# Patient Record
Sex: FEMALE | Race: WHITE | HISPANIC OR LATINO | Employment: OTHER | URBAN - METROPOLITAN AREA
[De-identification: names, ages, dates, MRNs, and addresses within clinical notes are randomized per-mention and may not be internally consistent; named-entity substitution may affect disease eponyms.]

---

## 2021-03-24 ENCOUNTER — IMMUNIZATION (OUTPATIENT)
Dept: NURSING | Facility: CLINIC | Age: 56
End: 2021-03-24
Payer: COMMERCIAL

## 2021-03-24 PROCEDURE — 0001A PR COVID VAC PFIZER DIL RECON 30 MCG/0.3 ML IM: CPT

## 2021-03-24 PROCEDURE — 91300 PR COVID VAC PFIZER DIL RECON 30 MCG/0.3 ML IM: CPT

## 2021-04-14 ENCOUNTER — OFFICE VISIT (OUTPATIENT)
Dept: NURSING | Facility: CLINIC | Age: 56
End: 2021-04-14
Attending: INTERNAL MEDICINE
Payer: COMMERCIAL

## 2021-04-14 PROCEDURE — 0002A PR COVID VAC PFIZER DIL RECON 30 MCG/0.3 ML IM: CPT

## 2021-04-14 PROCEDURE — 91300 PR COVID VAC PFIZER DIL RECON 30 MCG/0.3 ML IM: CPT

## 2021-05-01 ENCOUNTER — HEALTH MAINTENANCE LETTER (OUTPATIENT)
Age: 56
End: 2021-05-01

## 2021-10-11 ENCOUNTER — HEALTH MAINTENANCE LETTER (OUTPATIENT)
Age: 56
End: 2021-10-11

## 2022-05-22 ENCOUNTER — HEALTH MAINTENANCE LETTER (OUTPATIENT)
Age: 57
End: 2022-05-22

## 2022-09-25 ENCOUNTER — HEALTH MAINTENANCE LETTER (OUTPATIENT)
Age: 57
End: 2022-09-25

## 2023-05-08 ENCOUNTER — HEALTH MAINTENANCE LETTER (OUTPATIENT)
Age: 58
End: 2023-05-08

## 2023-06-04 ENCOUNTER — HEALTH MAINTENANCE LETTER (OUTPATIENT)
Age: 58
End: 2023-06-04

## 2024-09-24 ENCOUNTER — OFFICE VISIT (OUTPATIENT)
Dept: VASCULAR SURGERY | Facility: CLINIC | Age: 59
End: 2024-09-24
Payer: COMMERCIAL

## 2024-09-24 DIAGNOSIS — I78.1 SPIDER VEINS: Primary | ICD-10-CM

## 2024-09-24 PROCEDURE — 99203 OFFICE O/P NEW LOW 30 MIN: CPT | Performed by: SURGERY

## 2024-09-24 NOTE — LETTER
9/24/2024      Jodi Castro  [address] [place] [id number]      Dear Colleague,    Thank you for referring your patient, Jodi Castro, to the [facility] VEIN CLINIC [place]. Please see a copy of my visit note below.    After Visit Follow Up  Per Dr. Melton, patient was recommended to have 2 - 4 sessions at $407.00 of cosmetic sclerotherapy.    Reviewed with and gave to patient our vein clinic sclerotherapy packet of information which includes basic sclerotherapy information, pre-treatment instructions and post-treatment instructions.    Patient in agreement with plan and had no further questions.    Dai Woods RN on 9/24/2024 at 8:32 AM    VEINSOLUTIONS CONSULTATION    HPI:    Jodi Castro is a pleasant 59 year old self-referred female who presents with concerns of cosmetic spider telangiectasias.  She is self-conscious about these telangiectasias, not wanting to show her legs.    She has no pain has had no deep vein thrombosis, superficial phlebitis or hemorrhage.    Family history significant for telangiectasias in her mother.  She is not aware if she had treatment or not.  There is no family history of venous thromboembolism.    PAST MEDICAL HISTORY: Once to twice monthly migraine headaches    PAST SURGICAL HISTORY: No past surgical history on file.    FAMILY HISTORY: No family history on file.    SOCIAL HISTORY:   Social History     Tobacco Use     Smoking status: Never     Smokeless tobacco: Never   Substance Use Topics     Alcohol use: Not on file       REVIEW OF SYSTEMS: Review Of Systems  Skin: negative  Eyes: negative  Ears/Nose/Throat: negative  Respiratory: No shortness of breath, dyspnea on exertion, cough, or hemoptysis  Cardiovascular: negative  Gastrointestinal: negative  Genitourinary: negative  Musculoskeletal: Leg cramps/pain  Neurologic: Headaches  Psychiatric: negative  Hematologic/Lymphatic/Immunologic: negative  Endocrine: Hot  flashes      Vital signs:  There were no vitals taken for this visit.    Current Outpatient Medications   Medication Sig Dispense Refill     Multiple Vitamin (MULTIVITAMIN PO) Take by mouth daily.       TURMERIC PO Take by mouth daily.       VITAMIN D PO Take by mouth daily.         PHYSICAL EXAM:  General: Pleasant, NAD.   HEENT: Normocephalic, atraumatic, external ears and nose normal.   Respiratory: Normal respiratory effort.   Cardiovascular: Pulse is regular.   Musculoskeletal: Gait and station normal.  The joints of her fingers and toes without deformity.  There is no cyanosis of her nailbeds.   EXTREMITIES: Right lower extremity: Scattered telangiectasias on the proximal anterolateral right thigh extending across the popliteal fossa.  No varicose veins, stasis changes or edema.    Left lower extremity: Cluster of matted telangiectasias in the distal posterolateral thigh, extending into the lateral popliteal fossa.  No varicose veins, stasis changes or edema..    PULSES: R/L (3=normal pulse, 0=no palpable pulse) dorsalis pedis: 3/3; posterior tibial: 3/3.      Neurologic: Grossly normal  Psychiatric: Mood, affect, judgment and insight are normal       ASSESSMENT:  Bilateral lower extremity spider veins and telangiectasias of cosmetic concern.  We discussed the fact that these are estrogen related and of little medical risk other than a very small risk of hemorrhage.  The only reason to treat them would be for cosmetic purposes with goals of 75 to 80% reduction in the appearance of the veins.  She understands that multiple sessions may be necessary.    Risks of treatment including allergic reaction, deep vein thrombosis, ulceration, hyperpigmentation and trapped blood were discussed.  She voiced understanding and her questions answered.    PLAN:  Bilateral lower extremity cosmetic sclerotherapy     Cortez Melton MD    Dictated using Dragon voice recognition software which may result in transcription  errors          VEIN CLINIC LEG DRAWING:                Again, thank you for allowing me to participate in the care of your patient.        Sincerely,        Cortez Melton MD   38.6

## 2024-09-24 NOTE — PROGRESS NOTES
After Visit Follow Up  Per Dr. Melton, patient was recommended to have 2 - 4 sessions at $407 of cosmetic sclerotherapy.    Reviewed with and gave to patient our vein clinic sclerotherapy packet of information which includes basic sclerotherapy information, pre-treatment instructions and post-treatment instructions.    Patient in agreement with plan and had no further questions.    Meghan Lopez RN  [facility]  Vein Clinic

## 2024-09-24 NOTE — PROGRESS NOTES
VEINSOLUTIONS CONSULTATION    HPI:    Jodi Castro is a pleasant 59 year old self-referred female who presents with concerns of cosmetic spider telangiectasias.  She is self-conscious about these telangiectasias, not wanting to show her legs.    She has no pain has had no deep vein thrombosis, superficial phlebitis or hemorrhage.    Family history significant for telangiectasias in her mother.  She is not aware if she had treatment or not.  There is no family history of venous thromboembolism.    PAST MEDICAL HISTORY: Once to twice monthly migraine headaches    PAST SURGICAL HISTORY: No past surgical history on file.    FAMILY HISTORY: No family history on file.    SOCIAL HISTORY:   Social History     Tobacco Use    Smoking status: Never    Smokeless tobacco: Never   Substance Use Topics    Alcohol use: Not on file       REVIEW OF SYSTEMS: Review Of Systems  Skin: negative  Eyes: negative  Ears/Nose/Throat: negative  Respiratory: No shortness of breath, dyspnea on exertion, cough, or hemoptysis  Cardiovascular: negative  Gastrointestinal: negative  Genitourinary: negative  Musculoskeletal: Leg cramps/pain  Neurologic: Headaches  Psychiatric: negative  Hematologic/Lymphatic/Immunologic: negative  Endocrine: Hot flashes      Vital signs:  There were no vitals taken for this visit.    Current Outpatient Medications   Medication Sig Dispense Refill    Multiple Vitamin (MULTIVITAMIN PO) Take by mouth daily.      TURMERIC PO Take by mouth daily.      VITAMIN D PO Take by mouth daily.         PHYSICAL EXAM:  General: Pleasant, NAD.   HEENT: Normocephalic, atraumatic, external ears and nose normal.   Respiratory: Normal respiratory effort.   Cardiovascular: Pulse is regular.   Musculoskeletal: Gait and station normal.  The joints of her fingers and toes without deformity.  There is no cyanosis of her nailbeds.   EXTREMITIES: Right lower extremity: Scattered telangiectasias on the proximal anterolateral right  thigh extending across the popliteal fossa.  No varicose veins, stasis changes or edema.    Left lower extremity: Cluster of matted telangiectasias in the distal posterolateral thigh, extending into the lateral popliteal fossa.  No varicose veins, stasis changes or edema..    PULSES: R/L (3=normal pulse, 0=no palpable pulse) dorsalis pedis: 3/3; posterior tibial: 3/3.      Neurologic: Grossly normal  Psychiatric: Mood, affect, judgment and insight are normal       ASSESSMENT:  Bilateral lower extremity spider veins and telangiectasias of cosmetic concern.  We discussed the fact that these are estrogen related and of little medical risk other than a very small risk of hemorrhage.  The only reason to treat them would be for cosmetic purposes with goals of 75 to 80% reduction in the appearance of the veins.  She understands that multiple sessions may be necessary.    Risks of treatment including allergic reaction, deep vein thrombosis, ulceration, hyperpigmentation and trapped blood were discussed.  She voiced understanding and her questions answered.    PLAN:  Bilateral lower extremity cosmetic sclerotherapy     Cortez Melton MD    Dictated using Dragon voice recognition software which may result in transcription errors          VEIN CLINIC LEG DRAWING:

## 2024-09-24 NOTE — PROGRESS NOTES
After Visit Follow Up  Per Dr. Melton, patient was recommended to have 2 - 4 sessions at $407.00 of cosmetic sclerotherapy.    Reviewed with and gave to patient our vein clinic sclerotherapy packet of information which includes basic sclerotherapy information, pre-treatment instructions and post-treatment instructions.    Patient in agreement with plan and had no further questions.    Dai Woods RN on 9/24/2024 at 8:32 AM

## 2024-09-24 NOTE — PATIENT INSTRUCTIONS
Sclerotherapy: Pre-Treatment Instructions    Recommended Sessions:  __2-4____ treatment sessions    Pricing: Full session - $407                 *Payment is due at the time of visit following the treatment    Time Required per Treatment Session - About 45 minutes  Please come in 15 minutes before your scheduled appointment.  30 min.  Sclerotherapy treatments last approximately 30 minutes.  5 min.    A staff member will wipe your legs off with warm water and dry them with a wash cloth.                 Then you can put your compression hose on, get dressed and check out.  10 min.  After your treatment, you will be asked to walk around for 10 minutes before you get in your car.    Medications  Five days before your appointment, discontinue aspirin (Bufferin, Anacin, etc.) and Ibuprofen (Motrin, Advil, Aleve, etc.) to reduce bruising. Resume these medications the day following the treatment.    Leg Preparation  Do not shave your legs or apply any oil, lotion or powder the night before or the day of your treatment.    Clothing  Shorts:  Bring a pair of loose, comfortable shorts to wear during your treatment (or you can choose to wear ours). Shoes: Bring comfortable shoes to accommodate the compression hose after your treatment. Do not wear flip-flops or thong-style sandals unless you have open-toe compression hose.    Photographs  Photos will be taken before each treatment. This helps monitor your progress.    Injections  The physician will inject your veins with the sclerotherapy solution chosen to meet your specific needs.    Compression Hose  Please bring your compression hose if you have them. They may also be reserved for you at our clinic. Compression hose must be worn immediately after each sclerotherapy treatment.  The hose must be compression level 20-30, and they must be worn for 24 hours straight after your treatment.  If you have never worn compression hose before, a staff member will teach you how to put  them on.             You cannot have a treatment without compression hose.               They are critical to the success of your treatment!    You may purchase your compression hose from us. We will measure you and have the hose available when you come for your treatment.    Cancellation and Rescheduling  If you need to cancel or reschedule your sclerotherapy treatment, please give our office at least 24 hour notice.    Sclerotherapy: Basic Information    What is sclerotherapy?  Sclerotherapy is a treatment for  spider  veins.  Spider  veins are small veins just under your skin that can look red, blue or purple. Most  spider  veins are only a cosmetic problem.  Spider  veins are not useful and treating them will not affect your circulation.    How does sclerotherapy work?  1.  Injections: A very small needle is used to inject a solution into the  spider  veins. The solution irritates the cells that line the vein walls. This causes the veins to collapse. The vein walls to stick together and they can no longer carry blood. Different solutions are used based on the size of the veins.  2.  Compression:  The spider veins are kept collapsed by wearing compression stockings. Your body will break down and absorb the treated veins. You wear the compression hose for 24 hours after the treatment and then for 4 more days during your waking hours only.    How does the body heal after sclerotherapy?  The process is similar to how your body heals after a bad bruise. It takes 4-6 weeks or more for the healing to be complete. When the healing is complete, the vein is no longer visible. It may take more than one treatment.    How do I get the best results?  It is important to follow the post-sclerotherapy instructions. The best results require time and patience. The injection sites will continue to heal and fade for months after a treatment. Please discuss your expectations with your doctor to keep them realistic. Your doctor will  do everything possible to meet or exceed your expectations.    How many treatments are needed?  After your initial exam, your doctor will give you an estimate of the number of treatments that may be required. It depends upon the size, type, and quantity of your  spider  veins and on the doctor's assessment, your history and expectations. You may end up needing fewer or more treatments.    How soon can I have another treatment?  Additional treatments are scheduled every 4-6 weeks to allow time for the body to respond to the previous treatment.    Common Side Effects:  Itching  The areas that were injected may itch. This is usually mild and lasts less than a day. Do not use lotions or creams on your legs until the injection sites have healed over.    Pain  It is common to have some tenderness at the injection sites. Injection of the solution can be uncomfortable, but is usually well tolerated by most patients. The tenderness is temporary, lasting 24 hours at most. Tylenol or Ibuprofen can be used, if needed, following the product directions.    Bruising  This may occur at the injections sites. Bruising may be minimized by avoiding Aspirin and Ibuprofen products for five days before each treatment session.    Hyperpigmentation  A light brown discoloration of the skin may develop along the veins in the areas injected. Approximately 20-30% of patients treated note the discoloration (which is lighter and less obvious than the veins that are being treated). The hyperpigmentation usually fades in a couple of weeks, but may take several months to a year to totally resolve. There is a 1% chance of hyperpigmentation continuing after one year.    Trapped blood  A small amount of blood may become trapped and hardened in the veins. This may feel like a knot or cord and it may look dark blue or bruised. This is a common occurrence. You may need to return before your next treatment so this area can be drained to remove the trapped  blood. This will reduce the hyperpigmentation that can occur. The chance of this occurring can be decreased with proper use of compression hose after your treatment.    Matting  Matting is the formation of new, fine  spider  veins in the area injected.  It occurs in approximately 10% of patients injected. The exact reason for this is unknown. If untreated, the matting usually resolves in 3 to 12 months, but very rarely, it can be permanent. If the matting does not fade, it can be re-injected.    Rare Side Effects:  Ulceration at injection sites  Very rarely, a small ulcer will occur at the site where a vein is injected. An ulcer can take 4 to 6 weeks to completely heal. A small scar may result.    Allergic reactions  There is a very rare incidence of an allergic reaction to the solution injected. You will be observed for such reaction and will be treated appropriately should it occur. Please inform us of any allergy history.    Pulmonary embolus/Deep vein thrombosis  This is a blood clot which moves to the lungs/a blood clot in the deep vein system. There is an extraordinarily low incidence of this complication.      SCLEROTHERAPY AFTER CARE  Immediately:  After treatment, walk for 10-15 minutes before getting in your car.  If your trip home is more than 1 hour, stop and walk around for 5-10 minutes. Avoid sitting or standing for extended periods.   First 24 hours: Wear your compression continuously, even while in bed. After the 24 hours, you may shower if you want to. Put your hose back on, unless you are going to bed. You should NOT wear compression to bed after the first 24 hours. You may fly the next day, but wear your compression.   For 5 days: Wear the compression hose for waking hours only. You may continue to wear them longer than 5 days if you prefer.   For days 5-7: Walking is encouraged, as it promotes efficient circulation in your veins. You may do activities that raise your heart rate, but do NOT run,  jog, do high impact aerobics, or weight lifting. After 7 days, no activity restrictions.  Shaving: Wait a few days to shave or apply lotion.   Bathing: Do NOT take hot baths or sit in a hot tub for 7-10 days.    For 1 year: Wear SPF 30 sunscreen on your legs when in the sun. This is very important! It helps prevent darkening of the skin at the injection sites.   Medications: You may resume your usual medications, including aspirin or ibuprofen.    Common Things to Expect       Compression must be worn for the first 24 hours and then during the day for 5-7 days.    If larger veins are treated with ultrasound-guided sclerotherapy, you will have redness, firmness, tenderness, and swelling.  This firmness and tenderness may take 3-6 months to resolve. Ibuprofen and compression hose will aid in this process.    You will have bruising that can last up to 3 weeks. Most fading of the veins will occur between 3 and 6 weeks after treatment.    You may notice brown discoloration (hyperpigmentation) at the treatment site.  This should fade with time, but will take 3 months to 1 year to fully heal.     Some treated veins may look darker because of trapped blood within the vein. This trapped blood can be removed at a minimum of 1 month following treatment. Larger veins are more likely to develop trapped blood.    It is very important for you to use at least SPF 30 sunscreen in order to help prevent the discoloration of your skin.    Thigh High, medical grade, 20-30 mmHg strength, compression stockings are recommended (required if having a procedure or treatment)    Options for purchasing compression stockings:    You can call your insurance company and ask if compression stockings are covered.  They usually fall under your Durable Medical Equipment (DME) coverage, if covered. The DME codes if they ask are: Knee high , Thigh high , Panty .   Be sure to ask if you need a specific diagnosis for coverage, if your  deductible needs to be met first, how many pairs are covered and how often.  If insurance covers and you would like to order from [facility], or another medical supply company: call the clinic back and we can fax a prescription to your medical supply company of choice. They will bill your insurance and ship them to you. We will ask you color choice (black or beige), and toe choice (open or closed toe).    We sell many sizes in our clinic (except specialty order or petite sizing). We can check to see if we have your size.  Knee high (Mediven brand) are $60/pair  Thigh high (Mediven brand) are $85/pair.   If you would like to purchase from the clinic, let us know including your color choice (black or beige), and toe choice (open or closed toe), and we will set them aside for you to  and pay for at your next clinic appointment or the day of your procedure.    Online website ordering options:   [email or web address]  [email or web address]  [email or web address]  Amazon.com has many options available.         Migraines rarely occur following sclerotherapy, but are more likely in patients with a history of migraines.  Treat as you would any other migraine.

## 2024-10-18 ENCOUNTER — OFFICE VISIT (OUTPATIENT)
Dept: VASCULAR SURGERY | Facility: CLINIC | Age: 59
End: 2024-10-18
Payer: COMMERCIAL

## 2024-10-18 ENCOUNTER — ALLIED HEALTH/NURSE VISIT (OUTPATIENT)
Dept: VASCULAR SURGERY | Facility: CLINIC | Age: 59
End: 2024-10-18
Payer: COMMERCIAL

## 2024-10-18 DIAGNOSIS — I78.1 SPIDER VEINS: Primary | ICD-10-CM

## 2024-10-18 DIAGNOSIS — I78.1 SPIDER TELANGIECTASIS OF SKIN: Primary | ICD-10-CM

## 2024-10-18 PROCEDURE — 99207 PR NO CHARGE NURSE ONLY: CPT

## 2024-10-18 PROCEDURE — A6533 GC STOCKING THIGHLNGTH 18-30: HCPCS

## 2024-10-18 PROCEDURE — 36468 NJX SCLRSNT SPIDER VEINS: CPT | Performed by: SURGERY

## 2024-10-18 PROCEDURE — S9999 SALES TAX: HCPCS | Performed by: SURGERY

## 2024-10-18 NOTE — LETTER
10/18/2024      Jodi Castro  [address] [place] [id number]      Dear Colleague,    Thank you for referring your patient, Jodi Castro, to the [facility] VEIN CLINIC [place]. Please see a copy of my visit note below.        Vein Clinic Sclerotherapy Note     Indications:  Bilateral lower extremity spider telangiectasias of cosmetic concern     Procedure:  Bilateral lower extremity cosmetic sclerotherapy     Procedure description  Details of procedure including risks of allergic reaction, deep vein thrombosis, ulceration, superficial thrombophlebitis and hyperpigmentation were discussed.  The patient voiced understanding and wished to proceed.  Informed consent was obtained.    The patient had numerous reticular veins and telangiectasias most numerous over the posterolateral right thigh extending into the proximal posterior thigh.  She had mirror-image veins on the left lateral thigh but not as numerous as on the right with a few scattered over the distal medial thighs and a few scattered on the legs.  I donned the Syris headlight and injected these veins using 0.5% polidocanol foam.      I had her turn onto her stomach and injected reticular veins and telangiectasias in the popliteal fossas and proximal posterior thighs bilaterally.  Veins were more numerous in the right posterior thigh than in the left. We used a total of 3 syringes of 0.5% polidocanol foam.  I had the patient perform ankle pumps.    We cleaned her legs, had her don compression that had her walk for 10 minutes.  She will return in approximately 6 weeks in follow-up.    Sclerotherapy    Date/Time: 10/18/2024 2:55 PM    Performed by: Cortez Melton MD  Authorized by: Cortez Melton MD    Time out: Immediately prior to the procedure a time out was called    Type:  Cosmetic  Session:  Full  Procedure side:  Bilateral  Solution/Amount:  .5 POLIDOCANOL  Syringes:  3  Patient  tolerance:  Patient tolerated the procedure well with no immediate complications      LILLIE Melton MD    Dictated using Dragon voice recognition software which may result in transcription errors      Again, thank you for allowing me to participate in the care of your patient.        Sincerely,        Cortez Melton MD

## 2024-10-18 NOTE — PROGRESS NOTES
Patient purchased 1 pair(s) of thigh high, Black, CT, size 2 from the clinic today.     Informed patient all compression hose purchases are final.    Pola Rosa RN on 10/18/2024 at 2:27 PM

## 2024-10-18 NOTE — PROGRESS NOTES
Vein Clinic Sclerotherapy Note     Indications:  Bilateral lower extremity spider telangiectasias of cosmetic concern     Procedure:  Bilateral lower extremity cosmetic sclerotherapy     Procedure description  Details of procedure including risks of allergic reaction, deep vein thrombosis, ulceration, superficial thrombophlebitis and hyperpigmentation were discussed.  The patient voiced understanding and wished to proceed.  Informed consent was obtained.    The patient had numerous reticular veins and telangiectasias most numerous over the posterolateral right thigh extending into the proximal posterior thigh.  She had mirror-image veins on the left lateral thigh but not as numerous as on the right with a few scattered over the distal medial thighs and a few scattered on the legs.  I donned the Syris headlight and injected these veins using 0.5% polidocanol foam.      I had her turn onto her stomach and injected reticular veins and telangiectasias in the popliteal fossas and proximal posterior thighs bilaterally.  Veins were more numerous in the right posterior thigh than in the left. We used a total of 3 syringes of 0.5% polidocanol foam.  I had the patient perform ankle pumps.    We cleaned her legs, had her don compression that had her walk for 10 minutes.  She will return in approximately 6 weeks in follow-up.    Sclerotherapy    Date/Time: 10/18/2024 2:55 PM    Performed by: Cortez Melton MD  Authorized by: Cortez Melton MD    Time out: Immediately prior to the procedure a time out was called    Type:  Cosmetic  Session:  Full  Procedure side:  Bilateral  Solution/Amount:  .5 POLIDOCANOL  Syringes:  3  Patient tolerance:  Patient tolerated the procedure well with no immediate complications      LILLIE Melton MD    Dictated using Dragon voice recognition software which may result in transcription errors

## 2024-12-05 ENCOUNTER — OFFICE VISIT (OUTPATIENT)
Dept: VASCULAR SURGERY | Facility: CLINIC | Age: 59
End: 2024-12-05
Payer: COMMERCIAL

## 2024-12-05 DIAGNOSIS — I78.1 SPIDER TELANGIECTASIS OF SKIN: Primary | ICD-10-CM

## 2024-12-05 NOTE — PROGRESS NOTES
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  The patient voiced understanding and wished to proceed.  Informed consent was obtained.    I donned the Syris headlight and injected residual telangiectasias most removed only proximal posterior/posterolateral thigh extending down the posterolateral thigh to the knee, and both popliteal fossas and extending down the lateral and posterior left thigh as well.  We used a total of 3 syringes (9 mL) of 1% polidocanol foam.  I had the patient perform ankle pumps.    We cleaned her legs, had her don compression that had her walk for 10 minutes.  She will return in about 6 weeks in follow-up, possibly for 1 additional session.     Sclerotherapy    Date/Time: 12/5/2024 12:17 PM    Performed by: Cortez Melton MD  Authorized by: Cortez Melton MD    Time out: Immediately prior to the procedure a time out was called    Type:  Cosmetic  Session:  Full  Procedure side:  Bilateral  Solution/Amount:  .5 POLIDOCANOL  Syringes:  3 syringes (9 mL 1% polidocanol foam)  Evacuation of intraluminal thrombus under sterile conditions without complications.    Patient tolerance:  Patient tolerated the procedure well with no immediate complications  Wrap/Hose:  Sabas Melton MD    Dictated using Dragon voice recognition software which may result in transcription errors

## 2024-12-05 NOTE — LETTER
12/5/2024      Jodi Castro  [address] [place] [id number]      Dear Colleague,    Thank you for referring your patient, Jodi Castro, to the [facility] [place]. Please see a copy of my visit note below.        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  The patient voiced understanding and wished to proceed.  Informed consent was obtained.    I donned the Syris headlight and injected residual telangiectasias most removed only proximal posterior/posterolateral thigh extending down the posterolateral thigh to the knee, and both popliteal fossas and extending down the lateral and posterior left thigh as well.  We used a total of 3 syringes (9 mL) of 1% polidocanol foam.  I had the patient perform ankle pumps.    We cleaned her legs, had her don compression that had her walk for 10 minutes.  She will return in about 6 weeks in follow-up, possibly for 1 additional session.     Sclerotherapy    Date/Time: 12/5/2024 12:17 PM    Performed by: Cortez Melton MD  Authorized by: Cortez Melton MD    Time out: Immediately prior to the procedure a time out was called    Type:  Cosmetic  Session:  Full  Procedure side:  Bilateral  Solution/Amount:  .5 POLIDOCANOL  Syringes:  3 syringes (9 mL 1% polidocanol foam)  Evacuation of intraluminal thrombus under sterile conditions without complications.    Patient tolerance:  Patient tolerated the procedure well with no immediate complications  Wrap/Hose:  Sabas Melton MD    Dictated using Dragon voice recognition software  which may result in transcription errors      Again, thank you for allowing me to participate in the care of your patient.        Sincerely,        Cortez Melton MD

## 2025-01-16 ENCOUNTER — OFFICE VISIT (OUTPATIENT)
Dept: VASCULAR SURGERY | Facility: CLINIC | Age: 60
End: 2025-01-16
Payer: COMMERCIAL

## 2025-01-16 DIAGNOSIS — I78.1 SPIDER VEINS: Primary | ICD-10-CM

## 2025-01-16 NOTE — PROGRESS NOTES
[facility]in Clinic Consult Note    HPI:  Jodi Castro is a 59 year old female who was seen today following 2 sessions of cosmetic sclerotherapy performed by Dr. Melton.  Overall she is very happy with result in the bothersome spots on her right lateral thigh as well as her left lateral knee have cleared up and she is very happy with the result.  She denies any issues with skin changes, bruising, swelling and at this time she does not feel she needs further intervention.    ROS: Negative except as above mentioned in HPI    PHH:  No past medical history on file.   No past surgical history on file.    ALLERGIES:  Patient has no known allergies.    MEDS:    Current Outpatient Medications:     Multiple Vitamin (MULTIVITAMIN PO), Take by mouth daily., Disp: , Rfl:     TURMERIC PO, Take by mouth daily., Disp: , Rfl:     VITAMIN D PO, Take by mouth daily., Disp: , Rfl:     SOCIAL HABITS:    History   Smoking Status    Never   Smokeless Tobacco    Never     Social History    Substance and Sexual Activity      Alcohol use: Not on file      History   Drug Use Not on file       FAMILY HISTORY:  No family history on file.    PHYSICAL EXAMINATION:  Comfortable and in NAD   Respirations non-labored on room air   Regular rate, capillary refill brisk  Abdomen is non-tender   Extremities are warm and perfused without significant edema.  Pedal pulses are palpable  Skin is intact without ulceration or open wounds there is very mild discoloration on the posterior lateral right thigh and an injection site.  No matting or bruising present overlying the lower extremities.  There are very few very fine scattered spider telangiectasias and some reticular veins but no significant varicose veins.    IMAGING: None performed for this visit    ASSESSMENT:   Jodi Castro is a 59 year old female who is status post 2 sessions of cosmetic sclerotherapy and overall is happy  with result.  Today she feels like she does not have any other areas that are particularly bothersome to her she would like to have injected.  I have discussed with her that there is a possibility of recurrence and if anything pops up she can come back and see me for another session of cosmetic sclerotherapy.      PLAN:   Follow-up as needed    Salome Das MD, [facility]  Vascular Surgery    *This document has been dictated using Dragon voice recognition software which may result in some transcription errors*

## 2025-01-16 NOTE — LETTER
1/16/2025      Jodi Castro  [address] [place] [id number]      Dear Colleague,    Thank you for referring your patient, Jodi Castro, to the [facility] [place]. Please see a copy of my visit note below.                                          [facility] Consult Note    HPI:  Jodi Castro is a 59 year old female who was seen today following 2 sessions of cosmetic sclerotherapy performed by Dr. Melton.  Overall she is very happy with result in the bothersome spots on her right lateral thigh as well as her left lateral knee have cleared up and she is very happy with the result.  She denies any issues with skin changes, bruising, swelling and at this time she does not feel she needs further intervention.    ROS: Negative except as above mentioned in HPI    PHH:  No past medical history on file.   No past surgical history on file.    ALLERGIES:  Patient has no known allergies.    MEDS:    Current Outpatient Medications:      Multiple Vitamin (MULTIVITAMIN PO), Take by mouth daily., Disp: , Rfl:      TURMERIC PO, Take by mouth daily., Disp: , Rfl:      VITAMIN D PO, Take by mouth daily., Disp: , Rfl:     SOCIAL HABITS:    History   Smoking Status     Never   Smokeless Tobacco     Never     Social History    Substance and Sexual Activity      Alcohol use: Not on file      History   Drug Use Not on file       FAMILY HISTORY:  No family history on file.    PHYSICAL EXAMINATION:  Comfortable and in NAD   Respirations non-labored on room air   Regular rate, capillary refill brisk  Abdomen is non-tender   Extremities are warm and perfused without significant edema.  Pedal pulses are palpable  Skin is intact without ulceration or open wounds there is very mild discoloration on the posterior lateral right thigh and an injection site.  No matting or bruising present overlying the lower extremities.  There are very few very fine  scattered spider telangiectasias and some reticular veins but no significant varicose veins.    IMAGING: None performed for this visit    ASSESSMENT:   Jodi Castro is a 59 year old female who is status post 2 sessions of cosmetic sclerotherapy and overall is happy with result.  Today she feels like she does not have any other areas that are particularly bothersome to her she would like to have injected.  I have discussed with her that there is a possibility of recurrence and if anything pops up she can come back and see me for another session of cosmetic sclerotherapy.      PLAN:   Follow-up as needed    Salome Das MD, [facility]  Vascular Surgery    *This document has been dictated using Dragon voice recognition software which may result in some transcription errors*      Again, thank you for allowing me to participate in the care of your patient.        Sincerely,        Salome Das MD    Electronically signed

## 2025-02-15 ENCOUNTER — HEALTH MAINTENANCE LETTER (OUTPATIENT)
Age: 60
End: 2025-02-15